# Patient Record
Sex: MALE | HISPANIC OR LATINO | Employment: FULL TIME
[De-identification: names, ages, dates, MRNs, and addresses within clinical notes are randomized per-mention and may not be internally consistent; named-entity substitution may affect disease eponyms.]

---

## 2017-12-07 ENCOUNTER — CHARTING TRANS (OUTPATIENT)
Dept: OTHER | Age: 47
End: 2017-12-07

## 2017-12-07 ENCOUNTER — LAB SERVICES (OUTPATIENT)
Dept: OTHER | Age: 47
End: 2017-12-07

## 2017-12-11 LAB
ALBUMIN SERPL-MCNC: 4.5 G/DL (ref 3.6–5.1)
ALBUMIN/GLOB SERPL: 1.6 (ref 1–2.4)
ALP SERPL-CCNC: 82 UNITS/L (ref 45–117)
ALT SERPL-CCNC: 47 UNITS/L
ANION GAP SERPL CALC-SCNC: 14 MMOL/L (ref 10–20)
AST SERPL-CCNC: 22 UNITS/L
BILIRUB SERPL-MCNC: 0.9 MG/DL (ref 0.2–1)
BUN SERPL-MCNC: 15 MG/DL (ref 6–20)
BUN/CREAT SERPL: 17 (ref 7–25)
CALCIUM SERPL-MCNC: 9.3 MG/DL (ref 8.4–10.2)
CHLORIDE SERPL-SCNC: 104 MMOL/L (ref 98–107)
CHOLEST SERPL-MCNC: 191 MG/DL
CHOLEST/HDLC SERPL: 4.2
CO2 SERPL-SCNC: 27 MMOL/L (ref 21–32)
CREAT SERPL-MCNC: 0.87 MG/DL (ref 0.67–1.17)
GLOBULIN SER-MCNC: 2.9 G/DL (ref 2–4)
GLUCOSE SERPL-MCNC: 95 MG/DL (ref 65–99)
HBA1C MFR BLD: 5.3 % (ref 4.5–5.6)
HDLC SERPL-MCNC: 46 MG/DL
LDLC SERPL CALC-MCNC: 97 MG/DL
LENGTH OF FAST TIME PATIENT: 12 HRS
LENGTH OF FAST TIME PATIENT: 12 HRS
NONHDLC SERPL-MCNC: 145 MG/DL
POTASSIUM SERPL-SCNC: 3.8 MMOL/L (ref 3.4–5.1)
SODIUM SERPL-SCNC: 141 MMOL/L (ref 135–145)
TOTAL PROTEIN: 7.4 G/DL (ref 6.4–8.2)
TRIGL SERPL-MCNC: 239 MG/DL

## 2018-01-11 ENCOUNTER — CHARTING TRANS (OUTPATIENT)
Dept: OTHER | Age: 48
End: 2018-01-11

## 2018-02-01 ENCOUNTER — OFFICE VISIT (OUTPATIENT)
Dept: URGENT CARE | Facility: CLINIC | Age: 48
End: 2018-02-01
Payer: COMMERCIAL

## 2018-02-01 VITALS
RESPIRATION RATE: 19 BRPM | HEIGHT: 72 IN | WEIGHT: 200 LBS | OXYGEN SATURATION: 96 % | BODY MASS INDEX: 27.09 KG/M2 | DIASTOLIC BLOOD PRESSURE: 83 MMHG | TEMPERATURE: 100 F | SYSTOLIC BLOOD PRESSURE: 125 MMHG | HEART RATE: 94 BPM

## 2018-02-01 DIAGNOSIS — H66.93 BILATERAL OTITIS MEDIA, UNSPECIFIED OTITIS MEDIA TYPE: ICD-10-CM

## 2018-02-01 DIAGNOSIS — J01.40 ACUTE PANSINUSITIS, RECURRENCE NOT SPECIFIED: Primary | ICD-10-CM

## 2018-02-01 PROCEDURE — 99214 OFFICE O/P EST MOD 30 MIN: CPT | Mod: 25,S$GLB,, | Performed by: FAMILY MEDICINE

## 2018-02-01 PROCEDURE — 3008F BODY MASS INDEX DOCD: CPT | Mod: S$GLB,,, | Performed by: FAMILY MEDICINE

## 2018-02-01 PROCEDURE — 96372 THER/PROPH/DIAG INJ SC/IM: CPT | Mod: S$GLB,,, | Performed by: FAMILY MEDICINE

## 2018-02-01 RX ORDER — HYDROGEN PEROXIDE 3 %
20 SOLUTION, NON-ORAL MISCELLANEOUS
COMMUNITY

## 2018-02-01 RX ORDER — AMOXICILLIN AND CLAVULANATE POTASSIUM 875; 125 MG/1; MG/1
1 TABLET, FILM COATED ORAL 2 TIMES DAILY
Qty: 20 TABLET | Refills: 0 | Status: SHIPPED | OUTPATIENT
Start: 2018-02-01 | End: 2018-02-11

## 2018-02-01 RX ORDER — BETAMETHASONE SODIUM PHOSPHATE AND BETAMETHASONE ACETATE 3; 3 MG/ML; MG/ML
9 INJECTION, SUSPENSION INTRA-ARTICULAR; INTRALESIONAL; INTRAMUSCULAR; SOFT TISSUE
Status: COMPLETED | OUTPATIENT
Start: 2018-02-01 | End: 2018-02-01

## 2018-02-01 RX ADMIN — BETAMETHASONE SODIUM PHOSPHATE AND BETAMETHASONE ACETATE 9 MG: 3; 3 INJECTION, SUSPENSION INTRA-ARTICULAR; INTRALESIONAL; INTRAMUSCULAR; SOFT TISSUE at 01:02

## 2018-02-01 NOTE — PROGRESS NOTES
Subjective:       Patient ID: Sukhi Hyde is a 48 y.o. male.    Vitals:  height is 6' (1.829 m) and weight is 90.7 kg (200 lb). His oral temperature is 99.5 °F (37.5 °C). His blood pressure is 125/83 and his pulse is 94. His respiration is 19 and oxygen saturation is 96%.     Chief Complaint: Sinus Problem    Patient with headache x 1 day. Patient stating the headache started yesterday. Patient with ear pain, runny nose, and sore throat. Patient has been taking otc mucinex d, sudafed, and advil. No improvement with medication.       Sinus Problem   This is a new problem. The current episode started yesterday. The problem is unchanged. Associated symptoms include congestion, coughing, ear pain, headaches and a hoarse voice. Pertinent negatives include no chills, shortness of breath or sore throat.     Review of Systems   Constitution: Negative for chills, fever and malaise/fatigue.   HENT: Positive for congestion, ear pain and hoarse voice. Negative for sore throat.    Eyes: Negative for discharge and redness.   Cardiovascular: Negative for chest pain, dyspnea on exertion and leg swelling.   Respiratory: Positive for cough. Negative for shortness of breath, sputum production and wheezing.    Musculoskeletal: Negative for myalgias.   Gastrointestinal: Negative for abdominal pain and nausea.   Neurological: Positive for headaches.       Objective:      Physical Exam   Constitutional: He appears well-developed and well-nourished.   HENT:   Head: Normocephalic and atraumatic.   Right Ear: Tympanic membrane is injected and erythematous.   Left Ear: Tympanic membrane is injected and erythematous.   Nose: Mucosal edema and rhinorrhea present. Right sinus exhibits maxillary sinus tenderness and frontal sinus tenderness. Left sinus exhibits maxillary sinus tenderness and frontal sinus tenderness.   Mouth/Throat: Posterior oropharyngeal erythema present.   Cardiovascular: Normal rate, regular rhythm and normal heart sounds.     Pulmonary/Chest: Effort normal and breath sounds normal. He has no wheezes.   Lymphadenopathy:     He has cervical adenopathy.   Nursing note and vitals reviewed.      Assessment:       1. Acute pansinusitis, recurrence not specified    2. Bilateral otitis media, unspecified otitis media type        Plan:         Acute pansinusitis, recurrence not specified  -     amoxicillin-clavulanate 875-125mg (AUGMENTIN) 875-125 mg per tablet; Take 1 tablet by mouth 2 (two) times daily.  Dispense: 20 tablet; Refill: 0  -     betamethasone acetate-betamethasone sodium phosphate injection 9 mg; Inject 1.5 mLs (9 mg total) into the muscle one time.    Bilateral otitis media, unspecified otitis media type  -     amoxicillin-clavulanate 875-125mg (AUGMENTIN) 875-125 mg per tablet; Take 1 tablet by mouth 2 (two) times daily.  Dispense: 20 tablet; Refill: 0  -     betamethasone acetate-betamethasone sodium phosphate injection 9 mg; Inject 1.5 mLs (9 mg total) into the muscle one time.

## 2018-02-01 NOTE — PATIENT INSTRUCTIONS
Sinusitis (Antibiotic Treatment)    The sinuses are air-filled spaces within the bones of the face. They connect to the inside of the nose. Sinusitis is an inflammation of the tissue lining the sinus cavity. Sinus inflammation can occur during a cold. It can also be due to allergies to pollens and other particles in the air. Sinusitis can cause symptoms of sinus congestion and fullness. A sinus infection causes fever, headache and facial pain. There is often green or yellow drainage from the nose or into the back of the throat (post-nasal drip). You have been given antibiotics to treat this condition.  Home care:  · Take the full course of antibiotics as instructed. Do not stop taking them, even if you feel better.  · Drink plenty of water, hot tea, and other liquids. This may help thin mucus. It also may promote sinus drainage.  · Heat may help soothe painful areas of the face. Use a towel soaked in hot water. Or,  the shower and direct the hot spray onto your face. Using a vaporizer along with a menthol rub at night may also help.   · An expectorant containing guaifenesin may help thin the mucus and promote drainage from the sinuses.  · Over-the-counter decongestants may be used unless a similar medicine was prescribed. Nasal sprays work the fastest. Use one that contains phenylephrine or oxymetazoline. First blow the nose gently. Then use the spray. Do not use these medicines more often than directed on the label or symptoms may get worse. You may also use tablets containing pseudoephedrine. Avoid products that combine ingredients, because side effects may be increased. Read labels. You can also ask the pharmacist for help. (NOTE: Persons with high blood pressure should not use decongestants. They can raise blood pressure.)  · Over-the-counter antihistamines may help if allergies contributed to your sinusitis.    · Do not use nasal rinses or irrigation during an acute sinus infection, unless told to by  your health care provider. Rinsing may spread the infection to other sinuses.  · Use acetaminophen or ibuprofen to control pain, unless another pain medicine was prescribed. (If you have chronic liver or kidney disease or ever had a stomach ulcer, talk with your doctor before using these medicines. Aspirin should never be used in anyone under 18 years of age who is ill with a fever. It may cause severe liver damage.)  · Don't smoke. This can worsen symptoms.  Follow-up care  Follow up with your healthcare provider or our staff if you are not improving within the next week.  When to seek medical advice  Call your healthcare provider if any of these occur:  · Facial pain or headache becoming more severe  · Stiff neck  · Unusual drowsiness or confusion  · Swelling of the forehead or eyelids  · Vision problems, including blurred or double vision  · Fever of 100.4ºF (38ºC) or higher, or as directed by your healthcare provider  · Seizure  · Breathing problems  · Symptoms not resolving within 10 days  Date Last Reviewed: 4/13/2015  © 5223-6414 [employer]. [address] [place] [address]. All rights reserved. This information is not intended as a substitute for professional medical care. Always follow your healthcare professional's instructions.        Middle Ear Infection (Adult)  You have an infection of the middle ear, the space behind the eardrum. This is also called acute otitis media (AOM). Sometimes it is caused by the common cold. This is because congestion can block the internal passage (eustachian tube) that drains fluid from the middle ear. When the middle ear fills with fluid, bacteria can grow there and cause an infection. Oral antibiotics are used to treat this illness, not ear drops. Symptoms usually start to improve within 1 to 2 days of treatment.    Home care  The following are general care guidelines:  · Finish all of the antibiotic medicine given, even though you may feel better  after the first few days.  · You may use over-the-counter medicine, such as acetaminophen or ibuprofen, to control pain and fever, unless something else was prescribed. If you have chronic liver or kidney disease or have ever had a stomach ulcer or gastrointestinal bleeding, talk with your healthcare provider before using these medicines. Do not give aspirin to anyone under 18 years of age who has a fever. It may cause severe illness or death.  Follow-up care  Follow up with your healthcare provider, or as advised, in 2 weeks if all symptoms have not gotten better, or if hearing doesn't go back to normal within 1 month.  When to seek medical advice  Call your healthcare provider right away if any of these occur:  · Ear pain gets worse or does not improve after 3 days of treatment  · Unusual drowsiness or confusion  · Neck pain, stiff neck, or headache  · Fluid or blood draining from the ear canal  · Fever of 100.4°F (38°C) or as advised   · Seizure  Date Last Reviewed: 6/1/2016  © 4369-6147 [employer]. [address] [place] [address], [place] [address]. All rights reserved. This information is not intended as a substitute for professional medical care. Always follow your healthcare professional's instructions.

## 2018-11-02 VITALS
DIASTOLIC BLOOD PRESSURE: 85 MMHG | HEIGHT: 71 IN | SYSTOLIC BLOOD PRESSURE: 127 MMHG | WEIGHT: 222 LBS | TEMPERATURE: 97.2 F | HEART RATE: 72 BPM | BODY MASS INDEX: 31.08 KG/M2

## 2018-11-02 VITALS
RESPIRATION RATE: 18 BRPM | SYSTOLIC BLOOD PRESSURE: 137 MMHG | HEIGHT: 71 IN | HEART RATE: 77 BPM | BODY MASS INDEX: 29.26 KG/M2 | TEMPERATURE: 96.2 F | WEIGHT: 209 LBS | DIASTOLIC BLOOD PRESSURE: 84 MMHG

## 2018-12-23 RX ORDER — SENNOSIDES 8.6 MG
CAPSULE ORAL
COMMUNITY
Start: 2018-11-01 | End: 2019-11-01

## 2018-12-23 RX ORDER — DICLOFENAC SODIUM 75 MG/1
TABLET, DELAYED RELEASE ORAL
COMMUNITY
Start: 2018-11-01 | End: 2019-11-01

## 2018-12-23 RX ORDER — CYCLOBENZAPRINE HCL 10 MG
TABLET ORAL
COMMUNITY
Start: 2018-11-01 | End: 2018-12-28 | Stop reason: SDUPTHER

## 2018-12-23 RX ORDER — CYCLOBENZAPRINE HCL 5 MG
TABLET ORAL
COMMUNITY
End: 2018-12-28 | Stop reason: SDUPTHER

## 2018-12-26 PROBLEM — R31.29 MICROSCOPIC HEMATURIA: Status: ACTIVE | Noted: 2018-12-26

## 2018-12-26 PROBLEM — R19.7 DIARRHEA: Status: ACTIVE | Noted: 2018-12-26

## 2018-12-26 PROBLEM — D64.9 ANEMIA: Status: ACTIVE | Noted: 2018-12-26

## 2018-12-26 PROBLEM — Z96.651 STATUS POST RIGHT KNEE REPLACEMENT: Status: ACTIVE | Noted: 2018-12-26

## 2018-12-26 PROBLEM — N48.89 SORE OF PENIS: Status: ACTIVE | Noted: 2018-12-26

## 2018-12-26 PROBLEM — G44.209 TENSION HEADACHE: Status: ACTIVE | Noted: 2018-12-26

## 2018-12-26 PROBLEM — R36.9 PENILE DISCHARGE: Status: ACTIVE | Noted: 2018-12-26

## 2018-12-26 PROBLEM — N20.0 RENAL STONE: Status: ACTIVE | Noted: 2018-12-26

## 2018-12-26 PROBLEM — R10.9 ABDOMINAL PAIN: Status: ACTIVE | Noted: 2018-12-26

## 2018-12-26 PROBLEM — Z01.818 PREOP EXAMINATION: Status: ACTIVE | Noted: 2018-12-26

## 2018-12-26 PROBLEM — S33.8XXA SPRAIN OF GROIN: Status: ACTIVE | Noted: 2018-12-26

## 2018-12-26 PROBLEM — M25.521 RIGHT ELBOW PAIN: Status: ACTIVE | Noted: 2018-12-26

## 2018-12-28 ENCOUNTER — OFFICE VISIT (OUTPATIENT)
Dept: INTERNAL MEDICINE | Age: 48
End: 2018-12-28

## 2018-12-28 VITALS
HEART RATE: 71 BPM | TEMPERATURE: 97.3 F | BODY MASS INDEX: 29.37 KG/M2 | SYSTOLIC BLOOD PRESSURE: 144 MMHG | HEIGHT: 71 IN | DIASTOLIC BLOOD PRESSURE: 84 MMHG | WEIGHT: 209.77 LBS

## 2018-12-28 DIAGNOSIS — J98.8 VIRAL RESPIRATORY INFECTION: ICD-10-CM

## 2018-12-28 DIAGNOSIS — J06.9 ACUTE URI: Primary | ICD-10-CM

## 2018-12-28 DIAGNOSIS — K64.9 HEMORRHOIDS, UNSPECIFIED HEMORRHOID TYPE: ICD-10-CM

## 2018-12-28 DIAGNOSIS — B97.89 VIRAL RESPIRATORY INFECTION: ICD-10-CM

## 2018-12-28 DIAGNOSIS — M79.10 MYALGIA: ICD-10-CM

## 2018-12-28 DIAGNOSIS — G47.9 SLEEP DISTURBANCE: ICD-10-CM

## 2018-12-28 PROCEDURE — 99214 OFFICE O/P EST MOD 30 MIN: CPT | Performed by: STUDENT IN AN ORGANIZED HEALTH CARE EDUCATION/TRAINING PROGRAM

## 2018-12-28 RX ORDER — LORATADINE 10 MG/1
10 TABLET ORAL DAILY
Qty: 15 TABLET | Refills: 0 | Status: CANCELLED | OUTPATIENT
Start: 2018-12-28

## 2018-12-28 RX ORDER — ALPRAZOLAM 0.5 MG/1
TABLET ORAL
COMMUNITY
Start: 2016-02-22 | End: 2019-10-17 | Stop reason: ALTCHOICE

## 2018-12-28 RX ORDER — CYCLOBENZAPRINE HCL 5 MG
5 TABLET ORAL AT BEDTIME
Qty: 30 TABLET | Refills: 1 | Status: SHIPPED | OUTPATIENT
Start: 2018-12-28

## 2018-12-28 RX ORDER — DEXTROMETHORPHAN HYDROBROMIDE AND PROMETHAZINE HYDROCHLORIDE 15; 6.25 MG/5ML; MG/5ML
SYRUP ORAL
Qty: 118 ML | Refills: 1 | Status: SHIPPED | OUTPATIENT
Start: 2018-12-28 | End: 2019-10-17 | Stop reason: ALTCHOICE

## 2018-12-28 RX ORDER — AZITHROMYCIN 250 MG/1
TABLET, FILM COATED ORAL
Qty: 6 TABLET | Refills: 0 | Status: SHIPPED | OUTPATIENT
Start: 2018-12-28 | End: 2019-01-02

## 2018-12-28 RX ORDER — ESOMEPRAZOLE MAGNESIUM 40 MG/1
CAPSULE, DELAYED RELEASE ORAL
COMMUNITY

## 2019-01-03 DIAGNOSIS — J06.9 ACUTE URI: Primary | ICD-10-CM

## 2019-03-05 ENCOUNTER — OFFICE VISIT (OUTPATIENT)
Dept: INTERNAL MEDICINE | Age: 49
End: 2019-03-05

## 2019-03-05 VITALS
SYSTOLIC BLOOD PRESSURE: 136 MMHG | WEIGHT: 213.41 LBS | TEMPERATURE: 97.7 F | OXYGEN SATURATION: 95 % | DIASTOLIC BLOOD PRESSURE: 92 MMHG | BODY MASS INDEX: 29.88 KG/M2 | HEIGHT: 71 IN | HEART RATE: 78 BPM | RESPIRATION RATE: 14 BRPM

## 2019-03-05 DIAGNOSIS — H53.8 VISION BLURRING: Primary | ICD-10-CM

## 2019-03-05 DIAGNOSIS — Z00.00 ANNUAL PHYSICAL EXAM: ICD-10-CM

## 2019-03-05 DIAGNOSIS — K21.9 GASTROESOPHAGEAL REFLUX DISEASE WITHOUT ESOPHAGITIS: ICD-10-CM

## 2019-03-05 PROCEDURE — 99396 PREV VISIT EST AGE 40-64: CPT | Performed by: STUDENT IN AN ORGANIZED HEALTH CARE EDUCATION/TRAINING PROGRAM

## 2019-03-05 RX ORDER — FAMOTIDINE 20 MG/1
20 TABLET, FILM COATED ORAL 2 TIMES DAILY
Qty: 30 TABLET | Refills: 1 | Status: SHIPPED | OUTPATIENT
Start: 2019-03-05

## 2019-03-05 ASSESSMENT — PATIENT HEALTH QUESTIONNAIRE - PHQ9
SUM OF ALL RESPONSES TO PHQ9 QUESTIONS 1 AND 2: 0
2. FEELING DOWN, DEPRESSED OR HOPELESS: NOT AT ALL
SUM OF ALL RESPONSES TO PHQ9 QUESTIONS 1 AND 2: 0
1. LITTLE INTEREST OR PLEASURE IN DOING THINGS: NOT AT ALL

## 2019-03-06 ENCOUNTER — LAB SERVICES (OUTPATIENT)
Dept: LAB | Age: 49
End: 2019-03-06

## 2019-03-06 DIAGNOSIS — Z00.00 ANNUAL PHYSICAL EXAM: ICD-10-CM

## 2019-03-06 LAB
ALBUMIN SERPL-MCNC: 4.5 G/DL (ref 3.6–5.1)
ALBUMIN/GLOB SERPL: 1.8 {RATIO} (ref 1–2.4)
ALP SERPL-CCNC: 91 UNITS/L (ref 45–117)
ALT SERPL-CCNC: 66 UNITS/L
ANION GAP SERPL CALC-SCNC: 10 MMOL/L (ref 10–20)
AST SERPL-CCNC: 32 UNITS/L
BILIRUB SERPL-MCNC: 0.9 MG/DL (ref 0.2–1)
BUN SERPL-MCNC: 11 MG/DL (ref 6–20)
BUN/CREAT SERPL: 12 (ref 7–25)
CALCIUM SERPL-MCNC: 9.2 MG/DL (ref 8.4–10.2)
CHLORIDE SERPL-SCNC: 105 MMOL/L (ref 98–107)
CHOLEST SERPL-MCNC: 175 MG/DL
CHOLEST/HDLC SERPL: 5 {RATIO}
CO2 SERPL-SCNC: 30 MMOL/L (ref 21–32)
CREAT SERPL-MCNC: 0.94 MG/DL (ref 0.67–1.17)
FASTING STATUS PATIENT QL REPORTED: 12 HRS
GLOBULIN SER-MCNC: 2.5 G/DL (ref 2–4)
GLUCOSE SERPL-MCNC: 115 MG/DL (ref 65–99)
HBA1C MFR BLD: 5.1 % (ref 4.5–5.6)
HDLC SERPL-MCNC: 35 MG/DL
LDLC SERPL-MCNC: 94 MG/DL
LENGTH OF FAST TIME PATIENT: 12 HRS
NONHDLC SERPL-MCNC: 140 MG/DL
POTASSIUM SERPL-SCNC: 4.3 MMOL/L (ref 3.4–5.1)
PROT SERPL-MCNC: 7 G/DL (ref 6.4–8.2)
SODIUM SERPL-SCNC: 141 MMOL/L (ref 135–145)
TRIGL SERPL-MCNC: 229 MG/DL

## 2019-03-06 PROCEDURE — 80061 LIPID PANEL: CPT | Performed by: INTERNAL MEDICINE

## 2019-03-06 PROCEDURE — 80053 COMPREHEN METABOLIC PANEL: CPT | Performed by: INTERNAL MEDICINE

## 2019-03-06 PROCEDURE — 83036 HEMOGLOBIN GLYCOSYLATED A1C: CPT | Performed by: INTERNAL MEDICINE

## 2019-03-06 PROCEDURE — 36415 COLL VENOUS BLD VENIPUNCTURE: CPT | Performed by: INTERNAL MEDICINE

## 2019-03-13 ENCOUNTER — TELEPHONE (OUTPATIENT)
Dept: INTERNAL MEDICINE | Age: 49
End: 2019-03-13

## 2019-10-17 ENCOUNTER — LAB SERVICES (OUTPATIENT)
Dept: LAB | Age: 49
End: 2019-10-17

## 2019-10-17 ENCOUNTER — TELEPHONE (OUTPATIENT)
Dept: INTERNAL MEDICINE | Age: 49
End: 2019-10-17

## 2019-10-17 ENCOUNTER — OFFICE VISIT (OUTPATIENT)
Dept: INTERNAL MEDICINE | Age: 49
End: 2019-10-17

## 2019-10-17 VITALS
WEIGHT: 209.66 LBS | SYSTOLIC BLOOD PRESSURE: 131 MMHG | HEART RATE: 71 BPM | DIASTOLIC BLOOD PRESSURE: 80 MMHG | RESPIRATION RATE: 17 BRPM | HEIGHT: 71 IN | BODY MASS INDEX: 29.35 KG/M2 | TEMPERATURE: 97.5 F | OXYGEN SATURATION: 96 %

## 2019-10-17 DIAGNOSIS — R19.7 DIARRHEA, UNSPECIFIED TYPE: Primary | ICD-10-CM

## 2019-10-17 DIAGNOSIS — R19.7 DIARRHEA, UNSPECIFIED TYPE: ICD-10-CM

## 2019-10-17 PROBLEM — R31.29 MICROSCOPIC HEMATURIA: Status: RESOLVED | Noted: 2018-12-26 | Resolved: 2019-10-17

## 2019-10-17 PROBLEM — N20.0 RENAL STONE: Status: RESOLVED | Noted: 2018-12-26 | Resolved: 2019-10-17

## 2019-10-17 PROBLEM — N48.89 SORE OF PENIS: Status: RESOLVED | Noted: 2018-12-26 | Resolved: 2019-10-17

## 2019-10-17 PROBLEM — S33.8XXA SPRAIN OF GROIN: Status: RESOLVED | Noted: 2018-12-26 | Resolved: 2019-10-17

## 2019-10-17 PROBLEM — R36.9 PENILE DISCHARGE: Status: RESOLVED | Noted: 2018-12-26 | Resolved: 2019-10-17

## 2019-10-17 PROCEDURE — 80053 COMPREHEN METABOLIC PANEL: CPT | Performed by: INTERNAL MEDICINE

## 2019-10-17 PROCEDURE — 99213 OFFICE O/P EST LOW 20 MIN: CPT | Performed by: STUDENT IN AN ORGANIZED HEALTH CARE EDUCATION/TRAINING PROGRAM

## 2019-10-17 PROCEDURE — 86141 C-REACTIVE PROTEIN HS: CPT | Performed by: INTERNAL MEDICINE

## 2019-10-17 PROCEDURE — 85652 RBC SED RATE AUTOMATED: CPT | Performed by: INTERNAL MEDICINE

## 2019-10-17 PROCEDURE — 85025 COMPLETE CBC W/AUTO DIFF WBC: CPT | Performed by: INTERNAL MEDICINE

## 2019-10-17 PROCEDURE — 36415 COLL VENOUS BLD VENIPUNCTURE: CPT | Performed by: INTERNAL MEDICINE

## 2019-10-17 RX ORDER — BIFIDOBACTERIUM LONGUM SUBSP. INFANTIS, AVOBENZONE, HOMOSALATE, OCTISALATE, OCTOCRYLENE, AND OXYBENZONE
1.25 KIT DAILY
Qty: 30 TABLET | Refills: 9 | Status: SHIPPED | OUTPATIENT
Start: 2019-10-17

## 2019-10-17 RX ORDER — ESOMEPRAZOLE MAGNESIUM 40 MG/1
40 CAPSULE, DELAYED RELEASE ORAL DAILY
COMMUNITY
End: 2019-10-17 | Stop reason: ALTCHOICE

## 2019-10-17 RX ORDER — HYOSCYAMINE SULFATE EXTENDED-RELEASE 0.38 MG/1
0.38 TABLET ORAL EVERY 12 HOURS PRN
Qty: 60 TABLET | Refills: 12 | Status: SHIPPED | OUTPATIENT
Start: 2019-10-17

## 2019-10-17 ASSESSMENT — ENCOUNTER SYMPTOMS
ALLERGIC/IMMUNOLOGIC NEGATIVE: 1
EYE REDNESS: 0
VOICE CHANGE: 0
PHOTOPHOBIA: 0
COLOR CHANGE: 0
TROUBLE SWALLOWING: 0
PSYCHIATRIC NEGATIVE: 1
SORE THROAT: 0
VOMITING: 0
DIAPHORESIS: 0
BLOOD IN STOOL: 0
CONSTIPATION: 0
NAUSEA: 0
RHINORRHEA: 0
RECTAL PAIN: 0
COUGH: 0
BACK PAIN: 0
ANAL BLEEDING: 0
ABDOMINAL PAIN: 1
SHORTNESS OF BREATH: 0
FATIGUE: 0
ABDOMINAL DISTENTION: 0
FEVER: 0
LIGHT-HEADEDNESS: 1
DIARRHEA: 1
UNEXPECTED WEIGHT CHANGE: 0
APPETITE CHANGE: 0
HEMATOLOGIC/LYMPHATIC NEGATIVE: 1
EYE ITCHING: 0
EYE DISCHARGE: 0

## 2019-10-17 ASSESSMENT — PATIENT HEALTH QUESTIONNAIRE - PHQ9
SUM OF ALL RESPONSES TO PHQ9 QUESTIONS 1 AND 2: 0
1. LITTLE INTEREST OR PLEASURE IN DOING THINGS: NOT AT ALL
2. FEELING DOWN, DEPRESSED OR HOPELESS: NOT AT ALL
SUM OF ALL RESPONSES TO PHQ9 QUESTIONS 1 AND 2: 0

## 2019-10-18 LAB
ALBUMIN SERPL-MCNC: 4.7 G/DL (ref 3.6–5.1)
ALBUMIN/GLOB SERPL: 2 {RATIO} (ref 1–2.4)
ALP SERPL-CCNC: 93 UNITS/L (ref 45–117)
ALT SERPL-CCNC: 43 UNITS/L
ANION GAP SERPL CALC-SCNC: 11 MMOL/L (ref 10–20)
AST SERPL-CCNC: 26 UNITS/L
BASOPHILS # BLD AUTO: 0 K/MCL (ref 0–0.3)
BASOPHILS NFR BLD AUTO: 1 %
BILIRUB SERPL-MCNC: 0.9 MG/DL (ref 0.2–1)
BUN SERPL-MCNC: 13 MG/DL (ref 6–20)
BUN/CREAT SERPL: 13 (ref 7–25)
CALCIUM SERPL-MCNC: 9.2 MG/DL (ref 8.4–10.2)
CHLORIDE SERPL-SCNC: 106 MMOL/L (ref 98–107)
CO2 SERPL-SCNC: 28 MMOL/L (ref 21–32)
CREAT SERPL-MCNC: 0.97 MG/DL (ref 0.67–1.17)
CRP SERPL HS-MCNC: 0.78 MG/L
DIFFERENTIAL METHOD BLD: NORMAL
EOSINOPHIL # BLD AUTO: 0.2 K/MCL (ref 0.1–0.5)
EOSINOPHIL NFR SPEC: 3 %
ERYTHROCYTE [DISTWIDTH] IN BLOOD: 11.9 % (ref 11–15)
ERYTHROCYTE [SEDIMENTATION RATE] IN BLOOD: 2 MM/HR (ref 0–20)
FASTING STATUS PATIENT QL REPORTED: 12 HRS
GLOBULIN SER-MCNC: 2.3 G/DL (ref 2–4)
GLUCOSE SERPL-MCNC: 93 MG/DL (ref 65–99)
HCT VFR BLD CALC: 47 % (ref 39–51)
HGB BLD-MCNC: 16.1 G/DL (ref 13–17)
IMM GRANULOCYTES # BLD AUTO: 0 K/MCL (ref 0–0.2)
IMM GRANULOCYTES NFR BLD: 0 %
LYMPHOCYTES # BLD MANUAL: 2 K/MCL (ref 1–4.8)
LYMPHOCYTES NFR BLD MANUAL: 35 %
MCH RBC QN AUTO: 30.1 PG (ref 26–34)
MCHC RBC AUTO-ENTMCNC: 34.3 G/DL (ref 32–36.5)
MCV RBC AUTO: 88 FL (ref 78–100)
MONOCYTES # BLD MANUAL: 0.4 K/MCL (ref 0.3–0.9)
MONOCYTES NFR BLD MANUAL: 8 %
NEUTROPHILS # BLD: 3.1 K/MCL (ref 1.8–7.7)
NEUTROPHILS NFR BLD AUTO: 53 %
NRBC BLD MANUAL-RTO: 0 /100 WBC
PLATELET # BLD: 196 K/MCL (ref 140–450)
POTASSIUM SERPL-SCNC: 4.2 MMOL/L (ref 3.4–5.1)
PROT SERPL-MCNC: 7 G/DL (ref 6.4–8.2)
RBC # BLD: 5.34 MIL/MCL (ref 4.5–5.9)
SODIUM SERPL-SCNC: 141 MMOL/L (ref 135–145)
WBC # BLD: 5.7 K/MCL (ref 4.2–11)

## 2019-10-21 ENCOUNTER — TELEPHONE (OUTPATIENT)
Dept: SCHEDULING | Age: 49
End: 2019-10-21

## 2019-10-21 ENCOUNTER — LAB SERVICES (OUTPATIENT)
Dept: LAB | Age: 49
End: 2019-10-21

## 2019-10-21 DIAGNOSIS — R19.7 DIARRHEA, UNSPECIFIED TYPE: ICD-10-CM

## 2019-10-21 PROCEDURE — 87209 SMEAR COMPLEX STAIN: CPT | Performed by: INTERNAL MEDICINE

## 2019-10-21 PROCEDURE — 87427 SHIGA-LIKE TOXIN AG IA: CPT | Performed by: INTERNAL MEDICINE

## 2019-10-21 PROCEDURE — 87798 DETECT AGENT NOS DNA AMP: CPT | Performed by: INTERNAL MEDICINE

## 2019-10-21 PROCEDURE — 87449 NOS EACH ORGANISM AG IA: CPT | Performed by: INTERNAL MEDICINE

## 2019-10-21 PROCEDURE — 87177 OVA AND PARASITES SMEARS: CPT | Performed by: INTERNAL MEDICINE

## 2019-10-21 PROCEDURE — 87045 FECES CULTURE AEROBIC BACT: CPT | Performed by: INTERNAL MEDICINE

## 2019-10-21 PROCEDURE — 87046 STOOL CULTR AEROBIC BACT EA: CPT | Performed by: INTERNAL MEDICINE

## 2019-10-21 PROCEDURE — 87493 C DIFF AMPLIFIED PROBE: CPT | Performed by: INTERNAL MEDICINE

## 2019-10-22 ENCOUNTER — E-ADVICE (OUTPATIENT)
Dept: INTERNAL MEDICINE | Age: 49
End: 2019-10-22

## 2019-10-22 LAB
C DIFF DNA SPEC QL NAA+PROBE: NOT DETECTED
C PARVUM+HOMINIS COWP STL QL NAA+PROBE: NOT DETECTED
E HISTOLYTICA 18S RRNA STL QL NAA+PROBE: NOT DETECTED
G LAMBLIA 18S RRNA STL QL NAA+PROBE: NOT DETECTED
SPECIMEN SOURCE: NORMAL

## 2019-10-23 LAB
BACTERIA STL CULT: NORMAL
REPORT STATUS (RPT): NORMAL
SPECIMEN SOURCE: NORMAL

## 2019-10-24 LAB
O+P STL MICRO: NORMAL
REPORT STATUS (RPT): NORMAL
SPECIMEN SOURCE: NORMAL

## 2021-12-28 ENCOUNTER — TELEPHONE (OUTPATIENT)
Dept: SCHEDULING | Age: 51
End: 2021-12-28

## 2021-12-28 ENCOUNTER — APPOINTMENT (OUTPATIENT)
Dept: INTERNAL MEDICINE | Age: 51
End: 2021-12-28

## 2024-01-25 ENCOUNTER — APPOINTMENT (OUTPATIENT)
Dept: URBAN - METROPOLITAN AREA CLINIC 314 | Age: 54
Setting detail: DERMATOLOGY
End: 2024-01-25

## 2024-01-25 DIAGNOSIS — L73.1 PSEUDOFOLLICULITIS BARBAE: ICD-10-CM

## 2024-01-25 PROCEDURE — OTHER MIPS QUALITY: OTHER

## 2024-01-25 PROCEDURE — 99202 OFFICE O/P NEW SF 15 MIN: CPT

## 2024-01-25 PROCEDURE — OTHER COUNSELING: OTHER

## 2024-01-25 ASSESSMENT — LOCATION SIMPLE DESCRIPTION DERM
LOCATION SIMPLE: LEFT CHEEK
LOCATION SIMPLE: RIGHT CHEEK
LOCATION SIMPLE: CHIN

## 2024-01-25 ASSESSMENT — LOCATION DETAILED DESCRIPTION DERM
LOCATION DETAILED: LEFT LATERAL MANDIBULAR CHEEK
LOCATION DETAILED: RIGHT SUPERIOR LATERAL BUCCAL CHEEK
LOCATION DETAILED: LEFT CHIN

## 2024-01-25 ASSESSMENT — LOCATION ZONE DERM: LOCATION ZONE: FACE

## 2024-01-25 NOTE — PROCEDURE: COUNSELING
Detail Level: Simple
Patient Specific Counseling (Will Not Stick From Patient To Patient): -\\n\\nPlan: Patient was given PFB letters.\\nWell controlled as long as avoids close shaves\\nNew paperwork requires choices of \"mild/moderate PFB, CAN shave for respirator use in emergencies or when able to follow appropriate shaving protocol and treatment\" vs \"mod/severe, CANNOT shave for respirator use in emergencies or when able to follow appropriate shaving protocol and treatment\".\\nIt seems that mild/moderate option would still require regular shaving, which due to pt's mod-severe PFB should be avoided.\\nHowever, reviewed and pt verbalized agreement that if requiring gas mask for non-training purposes (for personal protection at work), would recommend close shave for optimal fit, or respirator which can be used w/ facial hair, as risks from possible chemical exposures outweigh benefit of not shaving in these instances.